# Patient Record
Sex: MALE | Race: AMERICAN INDIAN OR ALASKA NATIVE
[De-identification: names, ages, dates, MRNs, and addresses within clinical notes are randomized per-mention and may not be internally consistent; named-entity substitution may affect disease eponyms.]

---

## 2022-01-19 ENCOUNTER — HOSPITAL ENCOUNTER (EMERGENCY)
Dept: HOSPITAL 5 - ED | Age: 32
Discharge: LEFT BEFORE DISCHARGE | End: 2022-01-19
Payer: SELF-PAY

## 2022-01-19 DIAGNOSIS — Z53.21: ICD-10-CM

## 2022-01-19 DIAGNOSIS — F32.9: Primary | ICD-10-CM

## 2022-01-19 DIAGNOSIS — Z76.0: ICD-10-CM

## 2022-01-20 ENCOUNTER — HOSPITAL ENCOUNTER (EMERGENCY)
Dept: HOSPITAL 5 - ED | Age: 32
Discharge: HOME | End: 2022-01-20
Payer: SELF-PAY

## 2022-01-20 VITALS — SYSTOLIC BLOOD PRESSURE: 117 MMHG | DIASTOLIC BLOOD PRESSURE: 62 MMHG

## 2022-01-20 DIAGNOSIS — F32.9: Primary | ICD-10-CM

## 2022-01-20 DIAGNOSIS — Z76.0: ICD-10-CM

## 2022-01-20 PROCEDURE — 99282 EMERGENCY DEPT VISIT SF MDM: CPT

## 2022-01-20 NOTE — EMERGENCY DEPARTMENT REPORT
ED General Adult HPI





- General


Chief complaint: Medical Clearance


Stated complaint: REQ RX, NOT FEELING WELL


Time Seen by Provider: 01/20/22 05:33


Source: patient


Mode of arrival: Ambulatory


Limitations: No Limitations





- History of Present Illness


Initial comments: 





Two 11-year -American male with past medical history of depression who 

relocated from [facility] to this region presents Amy presents 

emergency department seeking a refill of his Paxil to get him over to his 

appointment with his mental health therapist which is in 2 weeks.  States that 

he is beginning to feel uneasy as he has missed couple of doses does not want to

chance having any compounded issues from his new medication.  Reports no chest 

pain, no palpitation, no fever, chills, sweats.  Feels his depression is 

worsening but not suicidal or homicidal


-: Gradual


Severity scale (0 -10): 0


Improves with: none


Worsens with: none


Associated Symptoms: denies other symptoms


Treatments Prior to Arrival: none





- Related Data


                                  Previous Rx's











 Medication  Instructions  Recorded  Last Taken  Type


 


PARoxetine [Paxil] 40 mg PO DAILY #30 tablet 01/20/22 Unknown Rx











                                    Allergies











Allergy/AdvReac Type Severity Reaction Status Date / Time


 


No Known Allergies Allergy   Unverified 01/19/22 14:37














ED Review of Systems


ROS: 


Stated complaint: REQ RX, NOT FEELING WELL


Other details as noted in HPI





Comment: All other systems reviewed and negative





ED Past Medical Hx





- Medications


Home Medications: 


                                Home Medications











 Medication  Instructions  Recorded  Confirmed  Last Taken  Type


 


PARoxetine [Paxil] 40 mg PO DAILY #30 tablet 01/20/22  Unknown Rx














ED Physical Exam





- General


Limitations: No Limitations


General appearance: alert, in no apparent distress





- Head


Head exam: Present: atraumatic, normocephalic





- Eye


Eye exam: Present: normal appearance





- ENT


ENT exam: Present: mucous membranes moist





- Neck


Neck exam: Present: normal inspection





- Respiratory


Respiratory exam: Present: normal lung sounds bilaterally.  Absent: respiratory 

distress





- Cardiovascular


Cardiovascular Exam: Present: regular rate, normal rhythm.  Absent: systolic 

murmur, diastolic murmur, rubs, gallop





- GI/Abdominal


GI/Abdominal exam: Present: soft, normal bowel sounds





- Rectal


Rectal exam: Present: deferred





- Extremities Exam


Extremities exam: Present: normal inspection





- Back Exam


Back exam: Present: normal inspection





- Neurological Exam


Neurological exam: Present: alert, oriented X3





- Psychiatric


Psychiatric exam: Present: normal affect, normal mood





- Skin


Skin exam: Present: warm, dry, intact, normal color.  Absent: rash





ED Course





                                   Vital Signs











  01/20/22





  04:23


 


Temperature 98.5 F


 


Pulse Rate 61


 


Respiratory 18





Rate 


 


Blood Pressure 125/79





[Right] 


 


O2 Sat by Pulse 100





Oximetry 











Critical care attestation.: 


If time is entered above; I have spent that time in minutes in the direct care 

of this critically ill patient, excluding procedure time.








ED Disposition


Clinical Impression: 


 Medication refill, Depression





Disposition: 01 HOME / SELF CARE / HOMELESS


Is pt being admited?: No


Does the pt Need Aspirin: No


Condition: Stable


Instructions:  Living With Depression


Prescriptions: 


PARoxetine [Paxil] 40 mg PO DAILY #30 tablet


Referrals: 


[facility] [Provider Group] - 3-5 Days

## 2022-02-08 ENCOUNTER — HOSPITAL ENCOUNTER (EMERGENCY)
Dept: HOSPITAL 5 - ED | Age: 32
Discharge: HOME | End: 2022-02-08
Payer: SELF-PAY

## 2022-02-08 VITALS — DIASTOLIC BLOOD PRESSURE: 75 MMHG | SYSTOLIC BLOOD PRESSURE: 129 MMHG

## 2022-02-08 DIAGNOSIS — F41.9: Primary | ICD-10-CM

## 2022-02-08 DIAGNOSIS — Z76.0: ICD-10-CM

## 2022-02-08 PROCEDURE — 99282 EMERGENCY DEPT VISIT SF MDM: CPT

## 2022-02-08 NOTE — EMERGENCY DEPARTMENT REPORT
ED Recheck HPI





- General


Chief Complaint: Recheck/Abnormal Lab/Rx


Stated Complaint: FEEL SICK/OUT OF MEDS


Source: patient


Mode of arrival: Ambulatory


Limitations: No Limitations





- History of Present Illness


Initial Comments: 





30 YO COMES TO ER FOR MED REFILL OF HIS PAXIL. HAS BEEN ON FOR MANY YEARS. HIS 

PCP IS IN [place]. HE IS HERE WORKING AND BECAUSE OF HIS JOB HE CANT GET BACK 

THERE TO GET IT. HE HAS MISSED 4 DOSES. 





COOPERATIVE


NO HI


NO SI


CALM








MD Complaint: medication refill request


Symptoms Since Prior Visit: no new symptoms


Associated Symptoms: none





- Related Data


                                  Previous Rx's











 Medication  Instructions  Recorded  Last Taken  Type


 


PARoxetine [Paxil] 40 mg PO DAILY #30 tablet 02/08/22 Unknown Rx











                                    Allergies











Allergy/AdvReac Type Severity Reaction Status Date / Time


 


No Known Allergies Allergy   Unverified 01/19/22 14:37














ED Review of Systems


ROS: 


Stated complaint: FEEL SICK/OUT OF MEDS


Other details as noted in HPI





Comment: All other systems reviewed and negative





ED Past Medical Hx





- Past Medical History


Previous Medical History?: Yes


Additional medical history: ANXIETY





- Surgical History


Past Surgical History?: No





- Medications


Home Medications: 


                                Home Medications











 Medication  Instructions  Recorded  Confirmed  Last Taken  Type


 


PARoxetine [Paxil] 40 mg PO DAILY #30 tablet 02/08/22  Unknown Rx














ED Physical Exam





- General


Limitations: No Limitations


General appearance: alert, in no apparent distress





- Head


Head exam: Present: atraumatic, normocephalic





- Eye


Eye exam: Present: normal appearance





- ENT


ENT exam: Present: mucous membranes moist





- Neck


Neck exam: Present: normal inspection





- Respiratory


Respiratory exam: Present: normal lung sounds bilaterally.  Absent: respiratory 

distress





- Cardiovascular


Cardiovascular Exam: Present: regular rate, normal rhythm.  Absent: systolic 

murmur, diastolic murmur, rubs, gallop





- GI/Abdominal


GI/Abdominal exam: Present: soft, normal bowel sounds





- Rectal


Rectal exam: Present: deferred





- Extremities Exam


Extremities exam: Present: normal inspection





- Back Exam


Back exam: Present: normal inspection





- Neurological Exam


Neurological exam: Present: alert, oriented X3





- Psychiatric


Psychiatric exam: Present: normal affect, normal mood





- Skin


Skin exam: Present: warm, dry, intact, normal color.  Absent: rash





ED Course


                                   Vital Signs











  02/08/22 02/08/22





  07:25 07:59


 


Temperature 97.6 F 97.5 F L


 


Pulse Rate 59 L 54 L


 


Respiratory 16 14





Rate  


 


Blood Pressure 126/74 


 


Blood Pressure  129/75





[Right]  


 


O2 Sat by Pulse 97 98





Oximetry  














ED Recheck MDM





- Core Measures


Measure Exclusions: not indicated





- Differential Diagnosis


Prescription Refill(s)





- Medical Decision Making





MED REFILL





NO HI


NO SI





PCP [place] AND HE CANT GET THERE RIGHT NOW BECAUSE OF JOB





WE DISCUSSED USE OF LOCAL PCP FOR MEDS IN THE FUTURE


HE VERBALIZES UNDERSTANDING








                             Vital Signs (72 hours)











  02/08/22





  07:25


 


Temperature 97.6 F


 


Pulse Rate 59 L


 


Respiratory 16





Rate 


 


Blood Pressure 126/74


 


O2 Sat by Pulse 97





Oximetry 











Critical care attestation.: 


If time is entered above; I have spent that time in minutes in the direct care 

of this critically ill patient, excluding procedure time.








ED Disposition


Clinical Impression: 


 Medication refill, Anxiety





Disposition: 01 HOME / SELF CARE / HOMELESS


Is pt being admited?: No


Does the pt Need Aspirin: No


Condition: Stable


Additional Instructions: 


SEE PCP FOR MED REFILLS


Prescriptions: 


PARoxetine [Paxil] 40 mg PO DAILY #30 tablet


Referrals: 


[facility] MD [Staff Physician] - 3-5 Days


Forms:  Work/School Release Form(ED)


Time of Disposition: 07:35